# Patient Record
Sex: FEMALE | Race: BLACK OR AFRICAN AMERICAN | NOT HISPANIC OR LATINO | URBAN - METROPOLITAN AREA
[De-identification: names, ages, dates, MRNs, and addresses within clinical notes are randomized per-mention and may not be internally consistent; named-entity substitution may affect disease eponyms.]

---

## 2017-06-23 ENCOUNTER — OUTPATIENT (OUTPATIENT)
Dept: OUTPATIENT SERVICES | Facility: HOSPITAL | Age: 67
LOS: 1 days | Discharge: HOME | End: 2017-06-23

## 2017-06-23 DIAGNOSIS — D72.819 DECREASED WHITE BLOOD CELL COUNT, UNSPECIFIED: ICD-10-CM

## 2017-06-28 ENCOUNTER — LABORATORY RESULT (OUTPATIENT)
Age: 67
End: 2017-06-28

## 2017-06-28 ENCOUNTER — RESULT REVIEW (OUTPATIENT)
Age: 67
End: 2017-06-28

## 2017-06-28 ENCOUNTER — APPOINTMENT (OUTPATIENT)
Dept: HEMATOLOGY ONCOLOGY | Facility: CLINIC | Age: 67
End: 2017-06-28

## 2017-06-28 ENCOUNTER — OUTPATIENT (OUTPATIENT)
Dept: OUTPATIENT SERVICES | Facility: HOSPITAL | Age: 67
LOS: 1 days | End: 2017-06-28
Payer: MEDICARE

## 2017-06-28 VITALS
HEIGHT: 63.78 IN | HEART RATE: 56 BPM | RESPIRATION RATE: 16 BRPM | BODY MASS INDEX: 23.62 KG/M2 | DIASTOLIC BLOOD PRESSURE: 78 MMHG | OXYGEN SATURATION: 100 % | SYSTOLIC BLOOD PRESSURE: 120 MMHG | TEMPERATURE: 97.6 F | WEIGHT: 136.66 LBS

## 2017-06-28 DIAGNOSIS — Z86.69 PERSONAL HISTORY OF OTHER DISEASES OF THE NERVOUS SYSTEM AND SENSE ORGANS: ICD-10-CM

## 2017-06-28 DIAGNOSIS — Z87.891 PERSONAL HISTORY OF NICOTINE DEPENDENCE: ICD-10-CM

## 2017-06-28 DIAGNOSIS — D68.9 COAGULATION DEFECT, UNSPECIFIED: ICD-10-CM

## 2017-06-28 DIAGNOSIS — Z78.9 OTHER SPECIFIED HEALTH STATUS: ICD-10-CM

## 2017-06-28 DIAGNOSIS — Z86.711 PERSONAL HISTORY OF PULMONARY EMBOLISM: ICD-10-CM

## 2017-06-28 DIAGNOSIS — Z80.49 FAMILY HISTORY OF MALIGNANT NEOPLASM OF OTHER GENITAL ORGANS: ICD-10-CM

## 2017-06-28 DIAGNOSIS — I49.3 VENTRICULAR PREMATURE DEPOLARIZATION: ICD-10-CM

## 2017-06-28 DIAGNOSIS — Z80.0 FAMILY HISTORY OF MALIGNANT NEOPLASM OF DIGESTIVE ORGANS: ICD-10-CM

## 2017-06-28 LAB
BASOPHILS # BLD AUTO: 0 K/UL — SIGNIFICANT CHANGE UP (ref 0–0.2)
BASOPHILS NFR BLD AUTO: 1 % — SIGNIFICANT CHANGE UP (ref 0–2)
EOSINOPHIL # BLD AUTO: 0.2 K/UL — SIGNIFICANT CHANGE UP (ref 0–0.5)
EOSINOPHIL NFR BLD AUTO: 4.9 % — SIGNIFICANT CHANGE UP (ref 0–6)
HCT VFR BLD CALC: 39.2 % — SIGNIFICANT CHANGE UP (ref 34.5–45)
HGB BLD-MCNC: 13.3 G/DL — SIGNIFICANT CHANGE UP (ref 11.5–15.5)
LYMPHOCYTES # BLD AUTO: 1.7 K/UL — SIGNIFICANT CHANGE UP (ref 1–3.3)
LYMPHOCYTES # BLD AUTO: 45.3 % — HIGH (ref 13–44)
MCHC RBC-ENTMCNC: 31.3 PG — SIGNIFICANT CHANGE UP (ref 27–34)
MCHC RBC-ENTMCNC: 33.8 G/DL — SIGNIFICANT CHANGE UP (ref 32–36)
MCV RBC AUTO: 92.5 FL — SIGNIFICANT CHANGE UP (ref 80–100)
MONOCYTES # BLD AUTO: 0.3 K/UL — SIGNIFICANT CHANGE UP (ref 0–0.9)
MONOCYTES NFR BLD AUTO: 8 % — SIGNIFICANT CHANGE UP (ref 2–14)
NEUTROPHILS # BLD AUTO: 1.5 K/UL — LOW (ref 1.8–7.4)
NEUTROPHILS NFR BLD AUTO: 40.7 % — LOW (ref 43–77)
OB PNL STL: NEGATIVE — SIGNIFICANT CHANGE UP
PLATELET # BLD AUTO: 135 K/UL — LOW (ref 150–400)
RBC # BLD: 4.24 M/UL — SIGNIFICANT CHANGE UP (ref 3.8–5.2)
RBC # FLD: 12.1 % — SIGNIFICANT CHANGE UP (ref 10.3–14.5)
WBC # BLD: 3.8 K/UL — SIGNIFICANT CHANGE UP (ref 3.8–10.5)
WBC # FLD AUTO: 3.8 K/UL — SIGNIFICANT CHANGE UP (ref 3.8–10.5)

## 2017-06-28 PROCEDURE — 81240 F2 GENE: CPT

## 2017-06-28 PROCEDURE — G0452: CPT | Mod: 26

## 2017-06-28 PROCEDURE — 81340 TRB@ GENE REARRANGE AMPLIFY: CPT

## 2017-06-28 PROCEDURE — 88184 FLOWCYTOMETRY/ TC 1 MARKER: CPT

## 2017-06-28 PROCEDURE — 88185 FLOWCYTOMETRY/TC ADD-ON: CPT

## 2017-06-28 PROCEDURE — 81342 TRG GENE REARRANGEMENT ANAL: CPT

## 2017-06-28 PROCEDURE — 81241 F5 GENE: CPT

## 2017-06-29 PROCEDURE — 88189 FLOWCYTOMETRY/READ 16 & >: CPT

## 2017-06-30 LAB
DNA PLOIDY SPEC FC-IMP: SIGNIFICANT CHANGE UP
PTR INTERPRETATION: SIGNIFICANT CHANGE UP

## 2017-07-06 LAB — TM INTERPRETATION: SIGNIFICANT CHANGE UP

## 2017-07-07 LAB — DNA PLOIDY SPEC FC-IMP: SIGNIFICANT CHANGE UP

## 2017-07-27 ENCOUNTER — RESULT REVIEW (OUTPATIENT)
Age: 67
End: 2017-07-27

## 2017-08-30 DIAGNOSIS — D72.819 DECREASED WHITE BLOOD CELL COUNT, UNSPECIFIED: ICD-10-CM

## 2017-08-30 DIAGNOSIS — M06.9 RHEUMATOID ARTHRITIS, UNSPECIFIED: ICD-10-CM

## 2017-08-30 DIAGNOSIS — D47.2 MONOCLONAL GAMMOPATHY: ICD-10-CM

## 2017-10-30 ENCOUNTER — APPOINTMENT (OUTPATIENT)
Dept: OPHTHALMOLOGY | Facility: CLINIC | Age: 67
End: 2017-10-30
Payer: MEDICARE

## 2017-10-30 DIAGNOSIS — H35.3130 NONEXUDATIVE AGE-RELATED MACULAR DEGENERATION, BILATERAL, STAGE UNSPECIFIED: ICD-10-CM

## 2017-10-30 PROCEDURE — 92225: CPT | Mod: RT

## 2017-10-30 PROCEDURE — 92134 CPTRZ OPH DX IMG PST SGM RTA: CPT

## 2017-10-30 PROCEDURE — 92004 COMPRE OPH EXAM NEW PT 1/>: CPT

## 2018-01-02 ENCOUNTER — OUTPATIENT (OUTPATIENT)
Dept: OUTPATIENT SERVICES | Facility: HOSPITAL | Age: 68
LOS: 1 days | Discharge: HOME | End: 2018-01-02

## 2018-01-02 DIAGNOSIS — D72.819 DECREASED WHITE BLOOD CELL COUNT, UNSPECIFIED: ICD-10-CM

## 2018-01-09 ENCOUNTER — RESULT REVIEW (OUTPATIENT)
Age: 68
End: 2018-01-09

## 2018-01-09 ENCOUNTER — APPOINTMENT (OUTPATIENT)
Dept: HEMATOLOGY ONCOLOGY | Facility: CLINIC | Age: 68
End: 2018-01-09
Payer: MEDICARE

## 2018-01-09 VITALS
HEART RATE: 58 BPM | SYSTOLIC BLOOD PRESSURE: 120 MMHG | OXYGEN SATURATION: 99 % | TEMPERATURE: 97 F | DIASTOLIC BLOOD PRESSURE: 84 MMHG | WEIGHT: 136.66 LBS | BODY MASS INDEX: 23.62 KG/M2 | RESPIRATION RATE: 16 BRPM

## 2018-01-09 DIAGNOSIS — D72.819 DECREASED WHITE BLOOD CELL COUNT, UNSPECIFIED: ICD-10-CM

## 2018-01-09 LAB
BASOPHILS # BLD AUTO: 0.1 K/UL — SIGNIFICANT CHANGE UP (ref 0–0.2)
EOSINOPHIL # BLD AUTO: 0 K/UL — SIGNIFICANT CHANGE UP (ref 0–0.5)
EOSINOPHIL NFR BLD AUTO: 5 % — SIGNIFICANT CHANGE UP (ref 0–6)
HCT VFR BLD CALC: 38.5 % — SIGNIFICANT CHANGE UP (ref 34.5–45)
HGB BLD-MCNC: 13.8 G/DL — SIGNIFICANT CHANGE UP (ref 11.5–15.5)
LYMPHOCYTES # BLD AUTO: 1.8 K/UL — SIGNIFICANT CHANGE UP (ref 1–3.3)
LYMPHOCYTES # BLD AUTO: 41 % — SIGNIFICANT CHANGE UP (ref 13–44)
MCHC RBC-ENTMCNC: 33 PG — SIGNIFICANT CHANGE UP (ref 27–34)
MCHC RBC-ENTMCNC: 35.8 G/DL — SIGNIFICANT CHANGE UP (ref 32–36)
MCV RBC AUTO: 92.1 FL — SIGNIFICANT CHANGE UP (ref 80–100)
MONOCYTES # BLD AUTO: 0.3 K/UL — SIGNIFICANT CHANGE UP (ref 0–0.9)
MONOCYTES NFR BLD AUTO: 8 % — SIGNIFICANT CHANGE UP (ref 2–14)
NEUTROPHILS # BLD AUTO: 2.3 K/UL — SIGNIFICANT CHANGE UP (ref 1.8–7.4)
NEUTROPHILS NFR BLD AUTO: 46 % — SIGNIFICANT CHANGE UP (ref 43–77)
PLAT MORPH BLD: NORMAL — SIGNIFICANT CHANGE UP
PLATELET # BLD AUTO: 121 K/UL — LOW (ref 150–400)
RBC # BLD: 4.17 M/UL — SIGNIFICANT CHANGE UP (ref 3.8–5.2)
RBC # FLD: 12.3 % — SIGNIFICANT CHANGE UP (ref 10.3–14.5)
RBC BLD AUTO: SIGNIFICANT CHANGE UP
WBC # BLD: 4.5 K/UL — SIGNIFICANT CHANGE UP (ref 3.8–10.5)
WBC # FLD AUTO: 4.5 K/UL — SIGNIFICANT CHANGE UP (ref 3.8–10.5)

## 2018-01-09 PROCEDURE — 99214 OFFICE O/P EST MOD 30 MIN: CPT

## 2018-01-11 LAB
ALBUMIN MFR SERPL ELPH: 59.8 %
ALBUMIN SERPL ELPH-MCNC: 4.1 G/DL
ALBUMIN SERPL-MCNC: 4.1 G/DL
ALBUMIN/GLOB SERPL: 1.5 RATIO
ALP BLD-CCNC: 43 U/L
ALPHA1 GLOB MFR SERPL ELPH: 4.5 %
ALPHA1 GLOB SERPL ELPH-MCNC: 0.3 G/DL
ALPHA2 GLOB MFR SERPL ELPH: 10.3 %
ALPHA2 GLOB SERPL ELPH-MCNC: 0.7 G/DL
ALT SERPL-CCNC: 18 U/L
ANION GAP SERPL CALC-SCNC: 13 MMOL/L
AST SERPL-CCNC: 23 U/L
B-GLOBULIN MFR SERPL ELPH: 11 %
B-GLOBULIN SERPL ELPH-MCNC: 0.7 G/DL
BILIRUB SERPL-MCNC: 0.3 MG/DL
BUN SERPL-MCNC: 11 MG/DL
CALCIUM SERPL-MCNC: 9.9 MG/DL
CHLORIDE SERPL-SCNC: 103 MMOL/L
CO2 SERPL-SCNC: 26 MMOL/L
CREAT SERPL-MCNC: 0.85 MG/DL
DEPRECATED KAPPA LC FREE/LAMBDA SER: 0.95 RATIO
DEPRECATED KAPPA LC FREE/LAMBDA SER: 0.95 RATIO
FOLATE SERPL-MCNC: >20 NG/ML
GAMMA GLOB FLD ELPH-MCNC: 1 G/DL
GAMMA GLOB MFR SERPL ELPH: 14.4 %
GLUCOSE SERPL-MCNC: 79 MG/DL
IGA SER QL IEP: 240 MG/DL
IGG SER QL IEP: 968 MG/DL
IGM SER QL IEP: 92 MG/DL
INTERPRETATION SERPL IEP-IMP: NORMAL
KAPPA LC CSF-MCNC: 1.46 MG/DL
KAPPA LC CSF-MCNC: 1.46 MG/DL
KAPPA LC SERPL-MCNC: 1.38 MG/DL
KAPPA LC SERPL-MCNC: 1.38 MG/DL
LDH SERPL-CCNC: 208 U/L
M PROTEIN SPEC IFE-MCNC: NORMAL
PCA AB SER QL IF: NORMAL
POTASSIUM SERPL-SCNC: 4.5 MMOL/L
PROT SERPL-MCNC: 6.8 G/DL
PROT SERPL-MCNC: 6.8 G/DL
PROT SERPL-MCNC: 7.1 G/DL
SODIUM SERPL-SCNC: 142 MMOL/L
VIT B12 SERPL-MCNC: >2000 PG/ML

## 2018-01-26 LAB
ANCA AB SER IF-ACNC: NEGATIVE
IF BLOCK AB SER QL: NORMAL

## 2018-04-02 ENCOUNTER — APPOINTMENT (OUTPATIENT)
Dept: OPHTHALMOLOGY | Facility: CLINIC | Age: 68
End: 2018-04-02
Payer: MEDICARE

## 2018-04-02 DIAGNOSIS — H35.9 UNSPECIFIED RETINAL DISORDER: ICD-10-CM

## 2018-04-02 DIAGNOSIS — H25.13 AGE-RELATED NUCLEAR CATARACT, BILATERAL: ICD-10-CM

## 2018-04-02 DIAGNOSIS — H35.411 LATTICE DEGENERATION OF RETINA, RIGHT EYE: ICD-10-CM

## 2018-04-02 DIAGNOSIS — Z79.899 OTHER LONG TERM (CURRENT) DRUG THERAPY: ICD-10-CM

## 2018-04-02 DIAGNOSIS — H35.3131 NONEXUDATIVE AGE-RELATED MACULAR DEGENERATION, BILATERAL, EARLY DRY STAGE: ICD-10-CM

## 2018-04-02 PROCEDURE — 92226: CPT | Mod: LT

## 2018-04-02 PROCEDURE — 92012 INTRM OPH EXAM EST PATIENT: CPT

## 2018-04-02 PROCEDURE — 92134 CPTRZ OPH DX IMG PST SGM RTA: CPT

## 2021-07-27 ENCOUNTER — APPOINTMENT (OUTPATIENT)
Dept: NEUROLOGY | Facility: CLINIC | Age: 71
End: 2021-07-27
Payer: MEDICARE

## 2021-07-27 VITALS
WEIGHT: 130 LBS | DIASTOLIC BLOOD PRESSURE: 77 MMHG | BODY MASS INDEX: 22.75 KG/M2 | HEIGHT: 63.5 IN | SYSTOLIC BLOOD PRESSURE: 149 MMHG | HEART RATE: 56 BPM

## 2021-07-27 DIAGNOSIS — R20.0 ANESTHESIA OF SKIN: ICD-10-CM

## 2021-07-27 DIAGNOSIS — G50.0 TRIGEMINAL NEURALGIA: ICD-10-CM

## 2021-07-27 PROCEDURE — 99204 OFFICE O/P NEW MOD 45 MIN: CPT

## 2021-07-27 NOTE — PHYSICAL EXAM
[General Appearance - Alert] : alert [Oriented To Time, Place, And Person] : oriented to person, place, and time [Cranial Nerves Optic (II)] : visual acuity intact bilaterally,  visual fields full to confrontation, pupils equal round and reactive to light [Cranial Nerves Oculomotor (III)] : extraocular motion intact [Cranial Nerves Trigeminal (V)] : facial sensation intact symmetrically [Cranial Nerves Facial (VII)] : face symmetrical [Cranial Nerves Vestibulocochlear (VIII)] : hearing was intact bilaterally [Cranial Nerves Accessory (XI - Cranial And Spinal)] : head turning and shoulder shrug symmetric [Cranial Nerves Glossopharyngeal (IX)] : tongue and palate midline [Cranial Nerves Hypoglossal (XII)] : there was no tongue deviation with protrusion [Motor Tone] : muscle tone was normal in all four extremities [Motor Strength] : muscle strength was normal in all four extremities [Involuntary Movements] : no involuntary movements were seen [No Muscle Atrophy] : normal bulk in all four extremities [Motor Handedness Right-Handed] : the patient is right hand dominant [Paresis Pronator Drift Right-Sided] : no pronator drift on the right [Paresis Pronator Drift Left-Sided] : no pronator drift on the left [Sensation Tactile Decrease] : light touch was intact [Sensation Pain / Temperature Decrease] : pain and temperature was intact [Sensation Vibration Decrease] : vibration was intact [Romberg's Sign] : Romberg's sign was negtive [Limited Balance] : balance was intact [Past-pointing] : there was no past-pointing [Tremor] : no tremor present [Coordination - Dysmetria Impaired Finger-to-Nose Bilateral] : not present [2+] : Patella left 2+ [1+] : Ankle jerk left 1+ [Plantar Reflex Right Only] : normal on the right [Plantar Reflex Left Only] : normal on the left [PERRL With Normal Accommodation] : pupils were equal in size, round, reactive to light, with normal accommodation [Extraocular Movements] : extraocular movements were intact [Neck Appearance] : the appearance of the neck was normal [] : no rash [Skin Lesions] : no skin lesions

## 2021-07-27 NOTE — HISTORY OF PRESENT ILLNESS
[FreeTextEntry1] : Patient reports that end of May this year she started to experience left facial tingling around the cheek and lower chin area. It would occur almost daily and last varying degrees , but mostly seconds to a minute or so and then resolve. \par She also has associated headache, described as pressure like and at times with visual changes. \par Currently she has some pressure in her head.\par She had an MRI of the brain done this past June by a private neurologist and it showed microvascular ischemic changes and on the left side the trigeminal nerve was being abutting by a vessel. \par

## 2021-07-27 NOTE — REVIEW OF SYSTEMS
[Fever] : no fever [Chills] : no chills [Feeling Tired] : feeling tired [As Noted in HPI] : as noted in HPI [Abnormal Sensation] : an abnormal sensation [Dizziness] : no dizziness [Lightheadedness] : no lightheadedness [Tension Headache] : tension-type headaches [Difficulty Walking] : no difficulty walking [Sleep Disturbances] : sleep disturbances [Eyesight Problems] : eyesight problems [Negative] : Heme/Lymph [de-identified] : due to ARGELIA

## 2021-07-27 NOTE — DATA REVIEWED
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

## 2021-09-27 ENCOUNTER — APPOINTMENT (OUTPATIENT)
Dept: NEUROLOGY | Facility: CLINIC | Age: 71
End: 2021-09-27
Payer: MEDICARE

## 2021-09-27 VITALS
WEIGHT: 129 LBS | DIASTOLIC BLOOD PRESSURE: 95 MMHG | HEIGHT: 63.5 IN | HEART RATE: 88 BPM | BODY MASS INDEX: 22.57 KG/M2 | SYSTOLIC BLOOD PRESSURE: 165 MMHG

## 2021-09-27 DIAGNOSIS — Z81.8 FAMILY HISTORY OF OTHER MENTAL AND BEHAVIORAL DISORDERS: ICD-10-CM

## 2021-09-27 PROCEDURE — 99215 OFFICE O/P EST HI 40 MIN: CPT

## 2021-09-27 RX ORDER — CYCLOSPORINE 0.5 MG/ML
0.05 EMULSION OPHTHALMIC
Qty: 180 | Refills: 0 | Status: ACTIVE | COMMUNITY
Start: 2021-09-01

## 2021-09-27 RX ORDER — MELOXICAM 15 MG/1
15 TABLET ORAL
Qty: 90 | Refills: 0 | Status: COMPLETED | COMMUNITY
Start: 2021-08-18

## 2021-09-27 RX ORDER — METOPROLOL SUCCINATE 25 MG/1
25 TABLET, EXTENDED RELEASE ORAL
Qty: 2 | Refills: 0 | Status: COMPLETED | COMMUNITY
Start: 2021-09-18

## 2021-09-27 NOTE — ASSESSMENT
[FreeTextEntry1] : Assessment:\par 69yo RH AAW with forgetfulness.\par Stress is a factor. Exam benign.\par \par Diagnostic Impression:\par -forgetfulness NOS\par \par Plan:\par Rule out reversible and vascular causes:\par -B vitamins, TFT, RPR\par -MRI brain w/o soila-will get CDs of recent 2021 MRI and 2017 for comparison\par -basic inflammatory labs\par -Lyme serology\par -NPT\par -No meds for now.\par I recommended to pursue mental and physical activity and to adhere to Mediterranean type of diet.\par \par \par A thorough discussion was entertained with the patient/caregiver regarding the use of psychoactive medications, their possible benefits and AE profile, including the risk of cardiovascular complications, including but not limited to applicable black box warning and teratogenicity, where appropriate.\par We discussed the benefits of being active, physically and mentally, and the need to to establish a routine in this respect.\par Driving abilities and firearms possession and use were discussed, in relation to progression of the cognitive decline, and the need to assess them periodically.\par Patient/caregiver advised to bring previous records to this office.\par All questions were answered at the time of the visit. We are certainly available for further discussion as needed.\par Patient/caregiver fully understands and agree with the plan.\par

## 2021-09-27 NOTE — HISTORY OF PRESENT ILLNESS
[FreeTextEntry1] : COVID VACCINE FULL. \par NO COVID.\par \par HPI: 69yo RH AAW with Sjogren/RA, 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 \par Tends to do many things at the same time and it may get too much for her.\par \par -Memory: recent events, dates, forget keys\par -Speech: some WFD\par -Orientation: ok\par -Praxis: ok\par -Decision making/Executive fx/Multitasking: loses focus on things\par \par -Sleep: ARGELIA, at times tired, but overall doing well\par \par -Appetite: overall stable, no real change\par \par -Motor symptoms: some balance issue, started a few weeks ago, and present in the past, improved with exercise\par \par -B/B: no issues\par \par -Psychiatric symptoms: admits to anxiety\par \par -Functional status:\par Hodge Index of [place] in Activities of Daily Living:\par 1. Bathing/Showerin\par 2. Dressin\par 3. Toiletin\par 4. Transferrin\par 5. Continence: 1\par 6: Feedin\par \par TOTAL: 6\par \par [place] Instrumental Activities of Daily Living:\par A. Ability to Use Telephone: 1\par B. Shoppin\par C. Food Preparation: 1\par D. Housekeepin\par E. Laundry: 1\par F. Transportation: 1\par G. Responsibility for Own Medications: 1\par H: Ability to Handle Finances: 1\par \par TOTAL: 8\par \par CDR: n.a.\par \par -Professional status: teacher, retired\par \par PCP and other physicians:\par -PCP: LINDSAY HERRING\par -Neuro: Maynor\par -Psychiatrist/therapist: Dr. Mark Staton\par -Hem: Ming\par -Rheum: Huggins\par \par Workup done: \par MRI 2017, no Disc, per report mild  MVD.\par

## 2021-09-27 NOTE — PHYSICAL EXAM
[General Appearance - Alert] : alert [General Appearance - In No Acute Distress] : in no acute distress [Oriented To Time, Place, And Person] : oriented to person, place, and time [Impaired Insight] : insight and judgment were intact [Affect] : the affect was normal [Person] : oriented to person [Place] : oriented to place [Time] : oriented to time [Short Term Intact] : short term memory intact [Remote Intact] : remote memory intact [Registration Intact] : recent registration memory intact [Concentration Intact] : normal concentrating ability [Visual Intact] : visual attention was ~T not ~L decreased [Naming Objects] : no difficulty naming common objects [Repeating Phrases] : no difficulty repeating a phrase [Writing A Sentence] : no difficulty writing a sentence [Fluency] : fluency intact [Comprehension] : comprehension intact [Reading] : reading intact [Current Events] : adequate knowledge of current events [Past History] : adequate knowledge of personal past history [Vocabulary] : adequate range of vocabulary [Total Score ___ / 30] : the patient achieved a score of [unfilled] /30 [Date / Time ___ / 5] : date / time [unfilled] / 5 [Place ___ / 5] : place [unfilled] / 5 [Registration ___ / 3] : registration [unfilled] / 3 [Serial Sevens ___/5] : serial sevens [unfilled] / 5 [Naming 2 Objects ___ / 2] : naming two objects [unfilled] / 2 [Repeating a Sentence ___ / 1] : repeating a sentence [unfilled] / 1 [Writing a Sentence ___ / 1] : write sentence [unfilled] / 1 [3-stage Verbal Command ___ / 3] : three-stage verbal command [unfilled] / 3 [Written Command ___ / 1] : written command [unfilled] / 1 [Copy a Design ___ / 1] : copy a design [unfilled] / 1 [Recall ___ / 3] : recall [unfilled] / 3 [Cranial Nerves Optic (II)] : visual acuity intact bilaterally,  visual fields full to confrontation, pupils equal round and reactive to light [Cranial Nerves Oculomotor (III)] : extraocular motion intact [Cranial Nerves Trigeminal (V)] : facial sensation intact symmetrically [Cranial Nerves Facial (VII)] : face symmetrical [Cranial Nerves Vestibulocochlear (VIII)] : hearing was intact bilaterally [Cranial Nerves Glossopharyngeal (IX)] : tongue and palate midline [Cranial Nerves Accessory (XI - Cranial And Spinal)] : head turning and shoulder shrug symmetric [Cranial Nerves Hypoglossal (XII)] : there was no tongue deviation with protrusion [Motor Strength] : muscle strength was normal in all four extremities [Involuntary Movements] : no involuntary movements were seen [No Muscle Atrophy] : normal bulk in all four extremities [Motor Handedness Right-Handed] : the patient is right hand dominant [Sensation Tactile Decrease] : light touch was intact [Balance] : balance was intact [2+] : Ankle jerk left 2+ [Outer Ear] : the ears and nose were normal in appearance [Oropharynx] : the oropharynx was normal [Neck Appearance] : the appearance of the neck was normal [Neck Cervical Mass (___cm)] : no neck mass was observed [Jugular Venous Distention Increased] : there was no jugular-venous distention [Thyroid Diffuse Enlargement] : the thyroid was not enlarged [Thyroid Nodule] : there were no palpable thyroid nodules [Auscultation Breath Sounds / Voice Sounds] : lungs were clear to auscultation bilaterally [Heart Rate And Rhythm] : heart rate was normal and rhythm regular [Heart Sounds] : normal S1 and S2 [Heart Sounds Gallop] : no gallops [Murmurs] : no murmurs [Heart Sounds Pericardial Friction Rub] : no pericardial rub [Full Pulse] : the pedal pulses are present [Edema] : there was no peripheral edema [Bowel Sounds] : normal bowel sounds [Abdomen Soft] : soft [Abdomen Tenderness] : non-tender [Abdomen Mass (___ Cm)] : no abdominal mass palpated [No CVA Tenderness] : no ~M costovertebral angle tenderness [No Spinal Tenderness] : no spinal tenderness [Abnormal Walk] : normal gait [Nail Clubbing] : no clubbing  or cyanosis of the fingernails [Musculoskeletal - Swelling] : no joint swelling seen [Motor Tone] : muscle strength and tone were normal [Skin Color & Pigmentation] : normal skin color and pigmentation [Skin Turgor] : normal skin turgor [] : no rash [Motor Strength Upper Extremities Bilaterally] : strength was normal in both upper extremities [Motor Strength Lower Extremities Bilaterally] : strength was normal in both lower extremities [Romberg's Sign] : Romberg's sign was negtive [Limited Balance] : balance was intact [Past-pointing] : there was no past-pointing [Tremor] : no tremor present [Plantar Reflex Right Only] : normal on the right [Plantar Reflex Left Only] : normal on the left [FreeTextEntry4] : Mental Status Exam\par Presidents: 4/5\par Alternating Pattern: ok\par Spiral: ok\par Clock: 3/3\par Repetition: ok \par \par Trail A: 40"; B: 65"\par Fluency: A: 17; Animals: 21/min\par \par Go-No-Go: ok\par \par R/L discrimination on self and examiner: ok\par Cross-line commands: ok\par Praxis:\par -Motor: ok\par -Dynamic/Luria: ok\par -Ideomotor/Imitation: ok \par -Ideational/writing/closing-in: ok\par -Dressing: ok. [FreeTextEntry8] : reduced swing RUE, due to scoliosis

## 2021-10-27 ENCOUNTER — APPOINTMENT (OUTPATIENT)
Dept: NEUROLOGY | Facility: CLINIC | Age: 71
End: 2021-10-27

## 2021-11-16 ENCOUNTER — APPOINTMENT (OUTPATIENT)
Dept: NEUROLOGY | Facility: CLINIC | Age: 71
End: 2021-11-16
Payer: MEDICARE

## 2021-11-16 PROCEDURE — 96116 NUBHVL XM PHYS/QHP 1ST HR: CPT

## 2021-11-16 PROCEDURE — 96133 NRPSYC TST EVAL PHYS/QHP EA: CPT

## 2021-11-16 PROCEDURE — 96139 PSYCL/NRPSYC TST TECH EA: CPT | Mod: NC

## 2021-11-16 PROCEDURE — 96121 NUBHVL XM PHY/QHP EA ADDL HR: CPT

## 2021-11-16 PROCEDURE — 96138 PSYCL/NRPSYC TECH 1ST: CPT | Mod: NC

## 2021-11-16 PROCEDURE — 96132 NRPSYC TST EVAL PHYS/QHP 1ST: CPT

## 2021-11-26 ENCOUNTER — TRANSCRIPTION ENCOUNTER (OUTPATIENT)
Age: 71
End: 2021-11-26

## 2022-01-03 ENCOUNTER — APPOINTMENT (OUTPATIENT)
Dept: OTOLARYNGOLOGY | Facility: CLINIC | Age: 72
End: 2022-01-03
Payer: MEDICARE

## 2022-01-03 VITALS
SYSTOLIC BLOOD PRESSURE: 159 MMHG | HEIGHT: 64 IN | BODY MASS INDEX: 21.34 KG/M2 | DIASTOLIC BLOOD PRESSURE: 91 MMHG | WEIGHT: 125 LBS | HEART RATE: 69 BPM

## 2022-01-03 DIAGNOSIS — R22.0 LOCALIZED SWELLING, MASS AND LUMP, HEAD: ICD-10-CM

## 2022-01-03 DIAGNOSIS — U07.1 COVID-19: ICD-10-CM

## 2022-01-03 DIAGNOSIS — J31.0 CHRONIC RHINITIS: ICD-10-CM

## 2022-01-03 DIAGNOSIS — R20.0 ANESTHESIA OF SKIN: ICD-10-CM

## 2022-01-03 DIAGNOSIS — R20.2 PARESTHESIA OF SKIN: ICD-10-CM

## 2022-01-03 PROCEDURE — 92570 ACOUSTIC IMMITANCE TESTING: CPT

## 2022-01-03 PROCEDURE — 92557 COMPREHENSIVE HEARING TEST: CPT

## 2022-01-03 PROCEDURE — 31231 NASAL ENDOSCOPY DX: CPT

## 2022-01-03 PROCEDURE — 99204 OFFICE O/P NEW MOD 45 MIN: CPT | Mod: CS

## 2022-01-03 RX ORDER — BACILLUS COAGULANS/INULIN 1B-250 MG
CAPSULE ORAL
Refills: 0 | Status: ACTIVE | COMMUNITY

## 2022-01-10 NOTE — REVIEW OF SYSTEMS
[Nasal Congestion] : nasal congestion [Nose Bleeds] : nose bleeds [Sinus Pain] : sinus pain [Sinus Pressure] : sinus pressure [Sense Of Smell Problem] : sense of smell problem [Swelling Neck] : swelling neck [Swelling Face] : face swelling [Eye Pain] : eye pain [Eyes Itch] : itching of the eyes [Palpitations] : palpitations [Heartburn] : heartburn [Anxiety] : anxiety [Depression] : depression [Negative] : Heme/Lymph [FreeTextEntry1] : daytime sleepiness, skin/hair changes, fatigue, headaches

## 2022-01-10 NOTE — ASSESSMENT
[FreeTextEntry1] : Reviewed and reconciled medications, allergies, PMHx, PSHx, SocHx, FMHx. \par \par h/o bells palsy \par \par Audio:\par -TYMPS: TYPE A AU \par -AD: HEARING -6000 HZ, MOD HF HL AT 8000 HZ\par -AS: HEARING -500 HZ, MILD SNHL AT 1000 HZ, RISING TO WNL 0993-3989 HZ, MILD TO MOD-SEVERE HF HL 1803-9602 HZ\par *LEFT ASYMM NOTED AT 1000 & 8000 HZ*\par Right 96% discrim 55 db\par Left 100% discrim 55db\par \par MRA angio head no contrast 12/27/2021 - normal MR angiogram head. No evidence of aneurysm. Vessel abutting the superior aspect of bilateral trigeminal nerve.\par MRI brain w/wo contrast 06/22/2021 - no abnormal cerebellopontine angle or internal auditory canal mass. No abnormal enhancement\par CT brain wo contrast 12/21/2021 - no evidence of acute abnormality. No significant finding. small amounts of secretions in the maxillary sinuses and areas of mucosal thickening in ethmoids.\par \par no test picked up soft tissue swelling all reports normal \par \par Plan: \par Flexible nasal endoscopy. MRI, MRA, and CT results discussed. Audio - results interpreted by Dr. Blanchard and reviewed with the patient. See neurologist for further evaluation. FU after results received from neurologist. \par

## 2022-01-10 NOTE — REASON FOR VISIT
[Initial Consultation] : an initial consultation for [FreeTextEntry2] : Cyst in sinuses/Numbness on RT side of face

## 2022-01-10 NOTE — DATA REVIEWED
[de-identified] : C/O: FACIAL NUMBNESS ON RIGHT SIDE FOLLOWING COVID-19 LAST MONTH\par -TYMPS: TYPE A AU \par -AD: HEARING -6000 HZ, MOD HF HL AT 8000 HZ\par -AS: HEARING -500 HZ, MILD SNHL AT 1000 HZ, RISING TO WNL 1790-5078 HZ, MILD TO MOD-SEVERE HF HL 6686-8944 HZ\par *LEFT ASYMM NOTED AT 1000 & 8000 HZ*\par RECS: 1) ENT F/U 2)RE-EVAL/ FURTHER TESTING AS PER MD \par

## 2022-01-10 NOTE — ADDENDUM
[FreeTextEntry1] : Documented by Jeison Lion acting as scribe for Dr. Blanchard on 01/03/2022. \par \par All Medical record entries made by the scribe were at my. Dr. Blanchard direction and personally dictated by me on 01/03/2022. I have reviewed the chart and agree that the record accurately reflects my personal performance of the history, physical exam, assessment and plan. I have also personally directed, reviewed, and agreed with the discharge instructions.

## 2022-01-10 NOTE — CONSULT LETTER
[Dear  ___] : Dear  [unfilled], [Consult Letter:] : I had the pleasure of evaluating your patient, [unfilled]. [Please see my note below.] : Please see my note below. [Consult Closing:] : Thank you very much for allowing me to participate in the care of this patient.  If you have any questions, please do not hesitate to contact me. [Sincerely,] : Sincerely, [FreeTextEntry3] : Rogers Blanchard MD FACS

## 2022-01-10 NOTE — PROCEDURE
[Anterior rhinoscopy insufficient to account for symptoms] : anterior rhinoscopy insufficient to account for symptoms [None] : none [Flexible Endoscope] : examined with the flexible endoscope [Nasal Mucosa Crusts / Sores] : no lesions [Nasal Mucosa] : no polyps [Normal] : the nasopharynx was normal [Adenoids Present] : the adenoids were absent [Paranasal Sinuses Middle Meatus] : no purulence [Paranasal Sinuses Maxillary Sinus] : no maxillary polyps [Paranasal Sinuses Ethmoid Sinus] : no ethmoid polyps [Paranasal Sinuses Sphenoid Sinus] : no spenoid polyps [FreeTextEntry6] : Procedure: Flexible Nasal Endoscopy: Risks, benefits, and alternatives of flexible endoscopy were explained to the patient. The patient gave oral consent to proceed. The flexible scope was inserted into the right nasal cavity. Endoscopy of the inferior and middle meatus was performed. No polyp, mass, or lesion was appreciated. Olfactory cleft was clear. Spheno-ethmoid recess is clear. Nasopharynx was clear. Turbinates were without mass. The procedure was repeated on the contralateral side with similar findings. \par  [de-identified] : swelling right side of the face
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
## 2022-01-10 NOTE — PHYSICAL EXAM
[Hearing Loss Right Only] : normal [Hearing Loss Left Only] : normal [FreeTextEntry1] : right cheek is tender when pushing up on the right check \par midline and back right inside the cheek is numb  \par soft tissue swelling of the right cheek [Normal] : orientation to person, place, and time: normal [FreeTextEntry2] : sinuses nontender to percussion  soft tissue tender and full  [de-identified] : numbness right cheek. motor branches intact.

## 2022-01-13 ENCOUNTER — APPOINTMENT (OUTPATIENT)
Dept: ORTHOPEDIC SURGERY | Facility: CLINIC | Age: 72
End: 2022-01-13
Payer: MEDICARE

## 2022-01-13 VITALS
HEIGHT: 64 IN | BODY MASS INDEX: 21.34 KG/M2 | WEIGHT: 125 LBS | HEART RATE: 66 BPM | DIASTOLIC BLOOD PRESSURE: 75 MMHG | SYSTOLIC BLOOD PRESSURE: 117 MMHG

## 2022-01-13 DIAGNOSIS — M06.9 RHEUMATOID ARTHRITIS, UNSPECIFIED: ICD-10-CM

## 2022-01-13 DIAGNOSIS — G56.02 CARPAL TUNNEL SYNDROME, LEFT UPPER LIMB: ICD-10-CM

## 2022-01-13 DIAGNOSIS — M35.00 SICCA SYNDROME, UNSPECIFIED: ICD-10-CM

## 2022-01-13 DIAGNOSIS — G56.01 CARPAL TUNNEL SYNDROME, RIGHT UPPER LIMB: ICD-10-CM

## 2022-01-13 PROCEDURE — 99203 OFFICE O/P NEW LOW 30 MIN: CPT

## 2022-01-15 NOTE — DISCUSSION/SUMMARY
[FreeTextEntry1] : The patient had right carpal tunnel release approximately 16 years ago.  Patient was doing well.  Patient has connective tissue disease.  Patient notes some numbness in the index and long fingers and it is possible that with the underlying connective tissue disease she may have regrowth of tenosynovium and recurrence of carpal tunnel syndrome.  Treatment and diagnostic options were proposed including electrodiagnostic study versus carpal tunnel cortisone injection as a 2 primary options.  Patient chose carpal tunnel cortisone injection as a therapeutic trial and diagnostic test.  If there is improvement and will confirm clinical carpal tunnel syndrome.  The patient will be monitored and if symptoms recur severely enough repeat carpal tunnel release would be a consideration.\par Patient perceives some weakness with use of index finger for power activities such as turning a lock.  There is mild A1 tenderness but no triggering.  Possibly this represents early trigger finger.  This will be monitored.  If symptoms continue then additional work-up will be undertaken including radiographs and possible index flexor sheath cortisone injection as a therapeutic trial and diagnostic test.\par Prognosis is limited.\par Return as needed.\par Precise diagnosis is unclear.\par  A lengthy and detailed discussion was held with the patient regarding analysis, treatment, and recommendations. All questions have been answered. At the conclusion the patient expressed acceptance and understanding.

## 2022-01-15 NOTE — PHYSICAL EXAM
[de-identified] : Right wrist:\par Full range of motion without localizing wrist joint findings.\par Right basal joint\par No crepitus, 2+ laxity, no pain, no tenderness\par Index finger A1 pulley subtle tenderness.  Subtle thickening of flexor tendon without overt triggering\par Mild to minimal pain associated\par No tenderness MP joint radially or ulnarly.\par No pain with collateral ligament stress test; good stability.\par No pain maximum flexion.\par Index finger PIP and DIP joints no notable finding.\par There is no other A1 pulley tenderness or triggering in any other finger, right hand.\par No pertinent MP, PIP, or DIP joint contributory findings.\par Patient does perceive some pain at DIP 2.\par \par Left wrist\par Good motion without localizing findings.\par Basal joint trace crepitus no pain\par No A1 pulley tenderness and no triggering in any finger.\par No pertinent MP, PIP, or DIP joint contributory findings.\par \par Neurologic\par Subjectively slightly decreased sensation right index finger pulp at rest\par Questionably minimally altered right long finger pulp at rest\par Phalen's test with median nerve compression: Does not seem to recreate or exacerbate symptoms\par Radial nerve motor and sensory and ulnar nerve motor and sensory are intact.\par \par Left hand\par Normal sensation\par Phalen's test with median nerve compression: Negative bilaterally.\par Radial nerve motor and sensory and ulnar nerve motor and sensory are intact.\par \par Skin: No cyanosis, clubbing, edema or rashes.\par Vascular: Radial pulses intact.\par Lymphatic: No streaking or epitrochlear adenopathy.\par The patient is awake, alert, and oriented. Affect appropriate. Cooperative.\par

## 2022-01-15 NOTE — HISTORY OF PRESENT ILLNESS
[FreeTextEntry1] : 70 y/o RHD woman presents for hand evaluation.\par Past history of Sjogren's syndrome, right Bell's palsy.\par Question of forgetfulness and short-term memory loss.\par Patient seen by myself on 1 prior occasion 2/19/2015 with swelling right index PIP joint.  This was thought to possibly be related to connective tissue disease.  Patient has had increased Plaquenil medication and symptoms are much improve from when she was seen previously.\par Past history right carpal tunnel release approx 2005 at [facility].\par In 2015 patient had physical findings suggesting mild left carpal tunnel syndrome.  Patient was under the care of Dr.V. Gomes, neurologist.\par \par TODAY:\par Pt feels that the right index finger is weak with power pinch, such as when turning a lock.\par Patient is not specifically aware of triggering of index finger.\par Patient perceives some numbness or tingling in the index finger and the long finger tip occasionally\par \par Pt retired teacher.

## 2022-01-25 ENCOUNTER — APPOINTMENT (OUTPATIENT)
Dept: NEUROLOGY | Facility: CLINIC | Age: 72
End: 2022-01-25
Payer: MEDICARE

## 2022-01-25 PROCEDURE — 96132 NRPSYC TST EVAL PHYS/QHP 1ST: CPT | Mod: 95

## 2022-01-25 PROCEDURE — 96133 NRPSYC TST EVAL PHYS/QHP EA: CPT | Mod: 95

## 2022-04-05 ENCOUNTER — APPOINTMENT (OUTPATIENT)
Dept: NEUROLOGY | Facility: CLINIC | Age: 72
End: 2022-04-05
Payer: MEDICARE

## 2022-04-05 VITALS
HEART RATE: 65 BPM | BODY MASS INDEX: 21.34 KG/M2 | WEIGHT: 125 LBS | HEIGHT: 64 IN | SYSTOLIC BLOOD PRESSURE: 146 MMHG | DIASTOLIC BLOOD PRESSURE: 85 MMHG

## 2022-04-05 DIAGNOSIS — F41.9 ANXIETY DISORDER, UNSPECIFIED: ICD-10-CM

## 2022-04-05 DIAGNOSIS — F32.A ANXIETY DISORDER, UNSPECIFIED: ICD-10-CM

## 2022-04-05 DIAGNOSIS — R68.89 OTHER GENERAL SYMPTOMS AND SIGNS: ICD-10-CM

## 2022-04-05 PROCEDURE — 99214 OFFICE O/P EST MOD 30 MIN: CPT

## 2022-04-05 NOTE — HISTORY OF PRESENT ILLNESS
[FreeTextEntry1] : COVID VACCINE FULL. \par COVID 2021.\par \par HPI-2022 Interval Hx:\par NPT (VAN) 2021 negative.\par She is under some stress recently.\par COVID in 2021 with severe trigeminal neuralgia (pain in the mesial portion of R V2 region, very deep, extending to upper teeth, stabbing, shocking-subsided in 2 days; there remains some numbness in the same area) on the R side, seen @ [place], took GBP for 2-3 weeks.\par Received an MRI brain recently, no report nor CD available.\par Sleep: ARGELIA not on CPAP; more awakenings now, for bathroom and else\par Appetite: ok, had a bout of lose stools, now better.\par Motor ok, better now.\par \par \par HPI: 71yo RH AAW with Sjogren/RA, 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 \par Tends to do many things at the same time and it may get too much for her.\par \par -Memory: recent events, dates, forget keys\par -Speech: some WFD\par -Orientation: ok\par -Praxis: ok\par -Decision making/Executive fx/Multitasking: loses focus on things\par \par -Sleep: ARGELIA, at times tired, but overall doing well\par \par -Appetite: overall stable, no real change\par \par -Motor symptoms: some balance issue, started a few weeks ago, and present in the past, improved with exercise\par \par -B/B: no issues\par \par -Psychiatric symptoms: admits to anxiety\par \par -Functional status:\par Hodge Index of [place] in Activities of Daily Living:\par 1. Bathing/Showerin\par 2. Dressin\par 3. Toiletin\par 4. Transferrin\par 5. Continence: 1\par 6: Feedin\par \par TOTAL: 6\par \par Healy-Konstantin Instrumental Activities of Daily Living:\par A. Ability to Use Telephone: 1\par B. Shoppin\par C. Food Preparation: 1\par D. Housekeepin\par E. Laundry: 1\par F. Transportation: 1\par G. Responsibility for Own Medications: 1\par H: Ability to Handle Finances: 1\par \par TOTAL: 8\par \par CDR: n.a.\par \par -Professional status: teacher, retired\par \par PCP and other physicians:\par -PCP: LINDSAY HERRING\par -Neuro: [facility]\par -Psychiatrist/therapist: Dr. Mark Staton\par -Hem: [facility]\par -Rheum: [place]\par \par Workup done: \par MRI 2017, no Disc, per report mild MVD.\par

## 2022-04-05 NOTE — PHYSICAL EXAM
[General Appearance - Alert] : alert [General Appearance - In No Acute Distress] : in no acute distress [Oriented To Time, Place, And Person] : oriented to person, place, and time [Impaired Insight] : insight and judgment were intact [Affect] : the affect was normal [Person] : oriented to person [Place] : oriented to place [Time] : oriented to time [Short Term Intact] : short term memory intact [Remote Intact] : remote memory intact [Registration Intact] : recent registration memory intact [Concentration Intact] : normal concentrating ability [Visual Intact] : visual attention was ~T not ~L decreased [Naming Objects] : no difficulty naming common objects [Repeating Phrases] : no difficulty repeating a phrase [Writing A Sentence] : no difficulty writing a sentence [Fluency] : fluency intact [Comprehension] : comprehension intact [Reading] : reading intact [Current Events] : adequate knowledge of current events [Past History] : adequate knowledge of personal past history [Vocabulary] : adequate range of vocabulary [Total Score ___ / 30] : the patient achieved a score of [unfilled] /30 [Date / Time ___ / 5] : date / time [unfilled] / 5 [Place ___ / 5] : place [unfilled] / 5 [Registration ___ / 3] : registration [unfilled] / 3 [Serial Sevens ___/5] : serial sevens [unfilled] / 5 [Naming 2 Objects ___ / 2] : naming two objects [unfilled] / 2 [Repeating a Sentence ___ / 1] : repeating a sentence [unfilled] / 1 [Writing a Sentence ___ / 1] : write sentence [unfilled] / 1 [3-stage Verbal Command ___ / 3] : three-stage verbal command [unfilled] / 3 [Written Command ___ / 1] : written command [unfilled] / 1 [Copy a Design ___ / 1] : copy a design [unfilled] / 1 [Recall ___ / 3] : recall [unfilled] / 3 [Cranial Nerves Optic (II)] : visual acuity intact bilaterally,  visual fields full to confrontation, pupils equal round and reactive to light [Cranial Nerves Oculomotor (III)] : extraocular motion intact [Cranial Nerves Trigeminal (V)] : facial sensation intact symmetrically [Cranial Nerves Facial (VII)] : face symmetrical [Cranial Nerves Vestibulocochlear (VIII)] : hearing was intact bilaterally [Cranial Nerves Glossopharyngeal (IX)] : tongue and palate midline [Cranial Nerves Accessory (XI - Cranial And Spinal)] : head turning and shoulder shrug symmetric [Cranial Nerves Hypoglossal (XII)] : there was no tongue deviation with protrusion [Motor Strength] : muscle strength was normal in all four extremities [Involuntary Movements] : no involuntary movements were seen [No Muscle Atrophy] : normal bulk in all four extremities [Motor Handedness Right-Handed] : the patient is right hand dominant [Sensation Tactile Decrease] : light touch was intact [Balance] : balance was intact [2+] : Ankle jerk left 2+ [Sclera] : the sclera and conjunctiva were normal [PERRL With Normal Accommodation] : pupils were equal in size, round, reactive to light, with normal accommodation [Extraocular Movements] : extraocular movements were intact [Outer Ear] : the ears and nose were normal in appearance [Oropharynx] : the oropharynx was normal [Neck Appearance] : the appearance of the neck was normal [Neck Cervical Mass (___cm)] : no neck mass was observed [Jugular Venous Distention Increased] : there was no jugular-venous distention [Thyroid Diffuse Enlargement] : the thyroid was not enlarged [Thyroid Nodule] : there were no palpable thyroid nodules [Auscultation Breath Sounds / Voice Sounds] : lungs were clear to auscultation bilaterally [Heart Rate And Rhythm] : heart rate was normal and rhythm regular [Heart Sounds] : normal S1 and S2 [Heart Sounds Gallop] : no gallops [Murmurs] : no murmurs [Heart Sounds Pericardial Friction Rub] : no pericardial rub [Full Pulse] : the pedal pulses are present [Edema] : there was no peripheral edema [Bowel Sounds] : normal bowel sounds [Abdomen Soft] : soft [Abdomen Tenderness] : non-tender [Abdomen Mass (___ Cm)] : no abdominal mass palpated [No CVA Tenderness] : no ~M costovertebral angle tenderness [No Spinal Tenderness] : no spinal tenderness [Abnormal Walk] : normal gait [Nail Clubbing] : no clubbing  or cyanosis of the fingernails [Musculoskeletal - Swelling] : no joint swelling seen [Motor Tone] : muscle strength and tone were normal [Skin Color & Pigmentation] : normal skin color and pigmentation [Skin Turgor] : normal skin turgor [] : no rash [FreeTextEntry1] : Exam stable. [Motor Strength Upper Extremities Bilaterally] : strength was normal in both upper extremities [Motor Strength Lower Extremities Bilaterally] : strength was normal in both lower extremities [Romberg's Sign] : Romberg's sign was negtive [Limited Balance] : balance was intact [Past-pointing] : there was no past-pointing [Tremor] : no tremor present [Plantar Reflex Right Only] : normal on the right [Plantar Reflex Left Only] : normal on the left [FreeTextEntry4] : Mental Status Exam\par Presidents: 4/5\par Alternating Pattern: ok\par Spiral: ok\par Clock: 3/3\par Repetition: ok \par \par Trail A: 40"; B: 65"\par Fluency: A: 17; Animals: 21/min\par \par Go-No-Go: ok\par \par R/L discrimination on self and examiner: ok\par Cross-line commands: ok\par Praxis:\par -Motor: ok\par -Dynamic/Luria: ok\par -Ideomotor/Imitation: ok \par -Ideational/writing/closing-in: ok\par -Dressing: ok. [FreeTextEntry8] : Mildly reduced swing RUE, due to scoliosis

## 2022-04-05 NOTE — ASSESSMENT
[FreeTextEntry1] : Assessment:\par 70yo RH AAW with forgetfulness.\par Stress is a factor. Exam benign, again.\par NPT benign in 11/2021.\par \par Diagnostic Impression:\par -forgetfulness NOS\par -anxiety and stress.\par \par Plan:\par -will have to review the MRI done recently and may have to complement with MRA to r/o neurovascular conflict @ the [facility]\par -no Rx for now, but will have to consider anti-anxiety\par -would recommend to restart CPAP. \par I recommended to pursue mental and physical activity and to adhere to Mediterranean type of diet.\par \par \par A thorough discussion was entertained with the patient/caregiver regarding the use of psychoactive medications, their possible benefits and AE profile, including the risk of cardiovascular complications, including but not limited to applicable black box warning and teratogenicity, where appropriate.\par We discussed the benefits of being active, physically and mentally, and the need to to establish a routine in this respect.\par Driving abilities and firearms possession and use were discussed, in relation to progression of the cognitive decline, and the need to assess them periodically.\par Patient/caregiver advised to bring previous records to this office.\par All questions were answered at the time of the visit. We are certainly available for further discussion as needed.\par Patient/caregiver fully understands and agree with the plan.\par

## 2022-04-19 ENCOUNTER — NON-APPOINTMENT (OUTPATIENT)
Age: 72
End: 2022-04-19

## 2022-07-13 NOTE — HISTORY OF PRESENT ILLNESS
[FreeTextEntry1] : 72 y/o RHD most recently seen 1/13/2022.\par Patient has history of connective tissue disease, Sjogren's syndrome and/or possibly rheumatoid arthritis.\par At that time the patient the patient had symptoms of numbness and tingling the right hand primarily in the index and long finger. Patient had undergone right carpal tunnel release approximately 2005 and had been doing well.  January 2022 the patient was under the belief that she had recurrence of carpal tunnel syndrome suggestive of right carpal tunnel syndrome.\par Patient was offered 2 options at that time.  Patient offered to have electrodiagnostic study or to have a cortisone injection in the carpal tunnel as a diagnostic test and therapeutic trial.  Patient chose cortisone injection.  The patient has not been seen since that time.\par \par TODAY: Patient returns for hand surgery follow-up.\par

## 2022-07-14 ENCOUNTER — APPOINTMENT (OUTPATIENT)
Dept: ORTHOPEDIC SURGERY | Facility: CLINIC | Age: 72
End: 2022-07-14

## 2022-12-26 ENCOUNTER — NON-APPOINTMENT (OUTPATIENT)
Age: 72
End: 2022-12-26

## 2022-12-27 ENCOUNTER — APPOINTMENT (OUTPATIENT)
Dept: PAIN MANAGEMENT | Facility: CLINIC | Age: 72
End: 2022-12-27

## 2022-12-27 VITALS
SYSTOLIC BLOOD PRESSURE: 158 MMHG | WEIGHT: 122 LBS | HEIGHT: 64 IN | HEART RATE: 59 BPM | RESPIRATION RATE: 16 BRPM | DIASTOLIC BLOOD PRESSURE: 92 MMHG | BODY MASS INDEX: 20.83 KG/M2

## 2022-12-27 PROCEDURE — 99205 OFFICE O/P NEW HI 60 MIN: CPT

## 2022-12-27 NOTE — ASSESSMENT
[FreeTextEntry1] : 73 y/o F with multiple medical comorbidities including anxiety with panic attacks, RA, Sjogren syndrome with trigeminal neuralgia right, gradually improved pain but still with mild daily pain as well as persistent numbness which is more troubling to patient.  She has sought evaluation of multiple neurologist prior as well as eval for vascular decompression and was told she is not a surgical candidate. \par \par \par -Discussed in detail the nature of chronic pain as well as tx options including PT, local modalities, regular exercise program, acupuncture, CBT. \par - Discussed trial of Vitamin B complex, Alphalipoic acid 600 mg 2x/day, L carnitine 500mg 2x/day, CoQ10\par - She may increase gabapentin by 100 mg every 10-14 days for a goal of 600 mg/day if tolerated. If gabapentin not effective or not tolerated may consider trial of carbamazepine at some point.\par -fu in 3-6 months or sooner if needed.  \par \par The patient had the opportunity to ask questions and all were answered to their satisfaction.  The patient verbalized understanding of the management plan and agreed with our recommendations.\par \par

## 2022-12-27 NOTE — PHYSICAL EXAM
[FreeTextEntry1] : General appearance: Well nourished and with attention to grooming.No signs of distress. No signs of toxicity.\par Head/Neck/spine: Face- mild dysethesia lateral aspect of nose and, sensory intact,  Examination of the cervical spine reveals normal range of motion in the neck, no midline tenderness, \par CV: RRR.\par Skin: No rash.\par Musculoskeletal: No joint deformities, no scoliosis, lordosis, kyphosis, pes cavus or hammer toes.\par Extremities: No peripheral edema.\par Neurological exam:\par Mental status: Patient is alert, attentive and oriented X3. Speech is coherent and fluent without dysarthria or aphasia.  Affect normal.\par CN: Pupils were 6 mm and reactive to light. Extraocular movements were full. Visual fields are intact to confrontation. No nystagmus. There was no face, jaw, palate or tongue weakness or atrophy. Facial sensation was normal and symmetric. Hearing was grossly intact. Shoulder shrug was normal.\par Motor exam revealed normal bulk and tone. There is no evidence of atrophy or fasciculations. There is no pronator drift. Manual muscle testing revealed 5/5 strength throughout including neck flexion/extension and proximal and distal muscles of the arms and legs.\par Sensory exam demonstrated was normal to touch, pin, proprioception, and vibration in arms and legs. Romberg was negative.\par DTRs: 2+ b/l biceps, 2+ triceps, 2 + brachioradialis, 2+ patellar, and 2+ ankle reflexes. Plantar responses were flexor bilaterally. \par Coord: Normal finger to nose testing and rapid alternating movements.\par Gait: Normal gait. \par

## 2022-12-27 NOTE — REVIEW OF SYSTEMS
[Nasal Discharge] : nasal discharge [Joint Pain] : joint pain [Joint Stiffness] : joint stiffness [As Noted in HPI] : as noted in HPI [Sleep Apnea] : sleep apnea [Anxiety] : anxiety [Negative] : Endocrine

## 2022-12-27 NOTE — HISTORY OF PRESENT ILLNESS
[FreeTextEntry1] : Ms. SOLANGE LAFLEUR is a 72 year RH female Pmhx Anxiety, Panic attacks, Rheumatoid arthritis, Sjogren syndrome, R [place]' palsy, ARGELIA on CPAP, who is here for initial evaluation of Right trigeminal neuralgia .  In May 2021 she was evaluated by Dr. Mcguire for left facial twitching, tingling.  She then had Covid infection December 2021 and had monoclonal antibody treatment.  She then developed severe excruciating pain face intermittent throughout the day, sometimes sharp/achy pain.  She initially thought it was sinus related . She was seen at [facility] and had outpatient MRI and Neuro follow up that diagnosed her with trigeminal neuralgia and started on low gabapentin w/out help.  Pain had subsided but had persistent right facial numbness .  She saw another Neurologist who increased amish to 300 mg q hs. She has now seen multiple neurologists . Currently she reports daily pain right cheek, nose, edie-oral up to 3/10 lasting a few seconds triggered by touch, brushing teeth, chewing, cold, stress.   She is more trouble by persistent numbness as if her face is swollen. \par \par NKDA\par Prior meds include:\par Current meds include: hydrochloroquine, metoprolol, gabapentin  300 mg q hs, MVI , Restasis, ASA 81 mg, Calcium, Vit C, B12 \par \par Social hx:  She is  and living in [place].  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 Former smoker- quit age 30 y/o , 5 pyh.  She has 1 drink a few times per year.  Denies illicits.  She tried to exercise regularly including stretching and stationary bike, walking.  \par \par

## 2023-03-27 ENCOUNTER — APPOINTMENT (OUTPATIENT)
Dept: PAIN MANAGEMENT | Facility: CLINIC | Age: 73
End: 2023-03-27
Payer: MEDICARE

## 2023-03-27 VITALS
HEIGHT: 64 IN | BODY MASS INDEX: 20.83 KG/M2 | DIASTOLIC BLOOD PRESSURE: 72 MMHG | HEART RATE: 77 BPM | SYSTOLIC BLOOD PRESSURE: 112 MMHG | WEIGHT: 122 LBS

## 2023-03-27 PROCEDURE — 99214 OFFICE O/P EST MOD 30 MIN: CPT

## 2023-03-27 NOTE — HISTORY OF PRESENT ILLNESS
[FreeTextEntry1] : 72 year RH female Pmhx Anxiety, Panic attacks, Rheumatoid arthritis, Sjogren syndrome, R [place]' palsy, ARGELIA on CPAP, chronic right Trigeminal neuralgia no a candidate for vascular decompression who presents today for follow up.  Since her last visit reports significant psychosocial stressors,  h/o transplant, prostate CA and now with CMV infection, sister hospitalized ICU stay and has since passed away. No new medical issues. Pain gradually improved pain currently she reports daily pain right cheek, nose, edie-oral up to 3/10 lasting a few seconds triggered by touch, brushing teeth, chewing, cold, stress.  Still  mild daily pain as well as persistent numbness which is more troubling to patient.   She is more trouble by persistent numbness as if her face is swollen. \par \par She has seen another DO Dr. Luis Amos- CT sinus which showed mild thickening of ethmoid and maxillary sinus.  She has sought evaluation of multiple neurologist prior as well as eval for vascular decompression and was told she is not a surgical candidate. \par \par NKDA\par Prior meds include:\par Current meds include: hydrochloroquine, metoprolol, gabapentin  100/300 mg q hs, MVI , Restasis, ASA 81 mg, Calcium, Vit C, B12 \par \par Social hx:  She is  and living in [place].  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 Former smoker- quit age 28 y/o , 5 pyh.  She has 1 drink a few times per year.  Denies illicits.  She tried to exercise regularly including stretching and stationary bike, walking.  \par \par

## 2023-03-27 NOTE — ASSESSMENT
[FreeTextEntry1] : 71 y/o F with multiple medical comorbidities including anxiety with panic attacks, RA, Sjogren syndrome with trigeminal neuralgia right, gradually improved pain but still with mild daily pain as well as persistent numbness which is more troubling to patient.\par \par -Discussed in detail the nature of chronic pain as well as tx options including PT, local modalities, regular exercise program, acupuncture, CBT. \par - Discussed trial of Vitamin B complex, Alphalipoic acid 600 mg 2x/day, L carnitine 500mg 2x/day, CoQ10\par -Consider adding Magnesium \par - Averse to taking meds , taking Gabapentin 100 mg pm and 300 mg q hs. She will consider increasing to 100/400mg. \par -consider trial of carbamazepine at some point.\par -fu in 3-6 months or sooner if needed.  \par \par The patient had the opportunity to ask questions and all were answered to their satisfaction.  The patient verbalized understanding of the management plan and agreed with our recommendations.\par \par

## 2023-08-15 ENCOUNTER — APPOINTMENT (OUTPATIENT)
Dept: PAIN MANAGEMENT | Facility: CLINIC | Age: 73
End: 2023-08-15
Payer: MEDICARE

## 2023-08-15 VITALS
WEIGHT: 122 LBS | DIASTOLIC BLOOD PRESSURE: 80 MMHG | SYSTOLIC BLOOD PRESSURE: 138 MMHG | HEIGHT: 64 IN | HEART RATE: 53 BPM | BODY MASS INDEX: 20.83 KG/M2

## 2023-08-15 PROCEDURE — 99214 OFFICE O/P EST MOD 30 MIN: CPT

## 2023-08-15 NOTE — ASSESSMENT
[FreeTextEntry1] : 71 y/o F with multiple medical comorbidities including anxiety with panic attacks, RA, Sjogren syndrome with trigeminal neuralgia right, gradually improved pain but still with mild daily pain as well as persistent numbness which is more troubling to patient.  -Discussed in detail the nature of chronic pain as well as tx options including PT, local modalities, regular exercise program, acupuncture, CBT.  - Discussed trial of Vitamin B complex, Alphalipoic acid 600 mg 2x/day, L carnitine 500mg 2x/day, CoQ10 -Consider adding Magnesium  - Averse to taking meds ,taking Gabapentin 200 mg pm and 300 mg q hs and will titrate up to 300mg 2x/day possible increase by another 100 mg at night if tolerated well.    -consider trial of carbamazepine at some point. -fu in 6 months or sooner if needed.    The patient had the opportunity to ask questions and all were answered to their satisfaction.  The patient verbalized understanding of the management plan and agreed with our recommendations.

## 2023-08-15 NOTE — HISTORY OF PRESENT ILLNESS
[FreeTextEntry1] : 72 year RH female Pmhx Anxiety, Panic attacks, Rheumatoid arthritis, Sjogren syndrome, R [place]' palsy, ARGELIA on CPAP, chronic right Trigeminal neuralgia not a candidate for vascular decompression who presents today for follow up. Since her last visit reports continues significant psychosocial stressors,  h/o transplant, prostate CA s/p cyber knife, sister passed away Feb 2023 and brother passed away last week. S/p recent cataract surgery B/L and reports increased sx right since surgery.  Pain overall fairly well controlled but still with mild daily pain right cheek, nose, edie-oral up to 3/10 lasting a few seconds triggered by touch, brushing teeth, chewing, cold, stress.  Most troubling sx in numbness.   She is currently taking gabapentin 200 mg in pm and 300 mg q hs   She has seen another DO Dr. Luis Amos- CT sinus which showed mild thickening of ethmoid and maxillary sinus.  She has sought evaluation of multiple neurologist prior as well as eval for vascular decompression and was told she is not a surgical candidate.   NKDA Prior meds include: Current meds include: hydrochloroquine, metoprolol, gabapentin 100/300 mg q hs, MVI , Restasis, ASA 81 mg, Calcium, Vit C, B12, Vitamin B complex, Alphalipoic acid 600 mg 2x/day, L carnitine 500mg 2x/day, CoQ10   Social hx:  She is  and living in [place].  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 Former smoker- quit age 30 y/o , 5 pyh.  She has 1 drink a few times per year.  Denies illicits.  She tried to exercise regularly including stretching and stationary bike, walking.  Her sister past away in Feb 2023, brother passed away earlier this month in [place].  Her  prostate CA getting tx.

## 2024-02-12 RX ORDER — GABAPENTIN 100 MG/1
100 CAPSULE ORAL 3 TIMES DAILY
Qty: 270 | Refills: 3 | Status: ACTIVE | COMMUNITY
Start: 2022-12-16 | End: 1900-01-01

## 2024-05-28 ENCOUNTER — APPOINTMENT (OUTPATIENT)
Dept: NEUROLOGY | Facility: CLINIC | Age: 74
End: 2024-05-28

## 2024-07-31 PROBLEM — Z86.69 HISTORY OF BELL'S PALSY: Status: RESOLVED | Noted: 2017-06-28 | Resolved: 2024-07-31

## 2024-10-28 ENCOUNTER — APPOINTMENT (OUTPATIENT)
Dept: NEUROLOGY | Facility: CLINIC | Age: 74
End: 2024-10-28
Payer: MEDICARE

## 2024-10-28 VITALS
DIASTOLIC BLOOD PRESSURE: 78 MMHG | SYSTOLIC BLOOD PRESSURE: 136 MMHG | HEART RATE: 59 BPM | BODY MASS INDEX: 22.2 KG/M2 | WEIGHT: 130 LBS | HEIGHT: 64 IN

## 2024-10-28 DIAGNOSIS — F32.A ANXIETY DISORDER, UNSPECIFIED: ICD-10-CM

## 2024-10-28 DIAGNOSIS — F41.9 ANXIETY DISORDER, UNSPECIFIED: ICD-10-CM

## 2024-10-28 DIAGNOSIS — G50.0 TRIGEMINAL NEURALGIA: ICD-10-CM

## 2024-10-28 DIAGNOSIS — M35.00 SICCA SYNDROME, UNSPECIFIED: ICD-10-CM

## 2024-10-28 DIAGNOSIS — R68.89 OTHER GENERAL SYMPTOMS AND SIGNS: ICD-10-CM

## 2024-10-28 DIAGNOSIS — R20.2 PARESTHESIA OF SKIN: ICD-10-CM

## 2024-10-28 PROCEDURE — G2211 COMPLEX E/M VISIT ADD ON: CPT

## 2024-10-28 PROCEDURE — 99214 OFFICE O/P EST MOD 30 MIN: CPT

## 2024-10-28 RX ORDER — ESCITALOPRAM OXALATE 5 MG/1
5 TABLET, FILM COATED ORAL
Qty: 30 | Refills: 1 | Status: ACTIVE | COMMUNITY
Start: 2024-10-28

## 2024-10-28 RX ORDER — ESCITALOPRAM OXALATE 5 MG/1
5 TABLET, FILM COATED ORAL
Refills: 0 | Status: ACTIVE | COMMUNITY

## 2025-03-03 ENCOUNTER — NON-APPOINTMENT (OUTPATIENT)
Age: 75
End: 2025-03-03

## 2025-03-18 ENCOUNTER — NON-APPOINTMENT (OUTPATIENT)
Age: 75
End: 2025-03-18

## 2025-03-18 ENCOUNTER — APPOINTMENT (OUTPATIENT)
Dept: PAIN MANAGEMENT | Facility: CLINIC | Age: 75
End: 2025-03-18
Payer: MEDICARE

## 2025-03-18 VITALS
HEART RATE: 51 BPM | HEIGHT: 64 IN | WEIGHT: 129 LBS | SYSTOLIC BLOOD PRESSURE: 148 MMHG | DIASTOLIC BLOOD PRESSURE: 83 MMHG | BODY MASS INDEX: 22.02 KG/M2

## 2025-03-18 PROCEDURE — 99214 OFFICE O/P EST MOD 30 MIN: CPT

## 2025-03-20 ENCOUNTER — APPOINTMENT (OUTPATIENT)
Dept: NEUROLOGY | Facility: CLINIC | Age: 75
End: 2025-03-20

## 2025-03-20 PROCEDURE — 96116 NUBHVL XM PHYS/QHP 1ST HR: CPT

## 2025-03-20 PROCEDURE — 96138 PSYCL/NRPSYC TECH 1ST: CPT | Mod: NC

## 2025-03-20 PROCEDURE — 96133 NRPSYC TST EVAL PHYS/QHP EA: CPT

## 2025-03-20 PROCEDURE — 96139 PSYCL/NRPSYC TST TECH EA: CPT | Mod: NC

## 2025-03-20 PROCEDURE — 96132 NRPSYC TST EVAL PHYS/QHP 1ST: CPT

## 2025-04-01 ENCOUNTER — APPOINTMENT (OUTPATIENT)
Dept: NEUROLOGY | Facility: CLINIC | Age: 75
End: 2025-04-01
Payer: MEDICARE

## 2025-04-01 PROCEDURE — 96133 NRPSYC TST EVAL PHYS/QHP EA: CPT | Mod: GT,2W
